# Patient Record
Sex: FEMALE | Race: WHITE | NOT HISPANIC OR LATINO | Employment: FULL TIME | URBAN - METROPOLITAN AREA
[De-identification: names, ages, dates, MRNs, and addresses within clinical notes are randomized per-mention and may not be internally consistent; named-entity substitution may affect disease eponyms.]

---

## 2019-03-03 ENCOUNTER — HOSPITAL ENCOUNTER (EMERGENCY)
Facility: HOSPITAL | Age: 29
Discharge: HOME | End: 2019-03-04
Attending: EMERGENCY MEDICINE | Admitting: EMERGENCY MEDICINE

## 2019-03-03 DIAGNOSIS — F19.10 SUBSTANCE ABUSE (HCC): ICD-10-CM

## 2019-03-03 DIAGNOSIS — F32.A DEPRESSION, UNSPECIFIED DEPRESSION TYPE: Primary | ICD-10-CM

## 2019-03-03 PROCEDURE — 36415 COLL VENOUS BLD VENIPUNCTURE: CPT

## 2019-03-03 PROCEDURE — 99283 EMERGENCY DEPT VISIT LOW MDM: CPT

## 2019-03-03 RX ORDER — HYDROXYZINE 50 MG/1
50 TABLET, FILM COATED ORAL 3 TIMES DAILY PRN
COMMUNITY
End: 2022-10-06

## 2019-03-03 RX ORDER — FLUOXETINE HYDROCHLORIDE 20 MG/1
20 CAPSULE ORAL DAILY
COMMUNITY
End: 2022-10-06

## 2019-03-04 VITALS
TEMPERATURE: 97.8 F | SYSTOLIC BLOOD PRESSURE: 121 MMHG | OXYGEN SATURATION: 99 % | BODY MASS INDEX: 26.08 KG/M2 | WEIGHT: 156.5 LBS | DIASTOLIC BLOOD PRESSURE: 78 MMHG | HEIGHT: 65 IN | HEART RATE: 88 BPM | RESPIRATION RATE: 18 BRPM

## 2019-03-04 LAB
AMPHET+METHAMPHET UR QL: POSITIVE
BARBITURATES UR QL SCN: NEGATIVE
BENZODIAZ UR QL SCN: NEGATIVE
CANNABINOIDS SERPL QL: NEGATIVE
COCAINE UR QL: NEGATIVE
ETHANOL BLD-MCNC: <10 MG/DL (ref 0–10)
ETHANOL UR QL: <0.01 %
HCG SERPL QL: NEGATIVE
METHADONE UR QL SCN: NEGATIVE
OPIATES UR QL: NEGATIVE
OXYCODONE UR QL SCN: NEGATIVE

## 2019-03-04 PROCEDURE — 80307 DRUG TEST PRSMV CHEM ANLYZR: CPT | Performed by: PHYSICIAN ASSISTANT

## 2019-03-04 PROCEDURE — 84703 CHORIONIC GONADOTROPIN ASSAY: CPT | Performed by: PHYSICIAN ASSISTANT

## 2019-03-04 PROCEDURE — 90791 PSYCH DIAGNOSTIC EVALUATION: CPT

## 2019-03-04 NOTE — ED PROVIDER NOTES
"Pt is a 29 y.o. female who presents to the ED complaining of AMS that started today. Pt does not have any thoughts of hurting herself or anyone else. Currently takes 20 mg Prozac and was on 40 mg originally but states that it makes her \"mean\". There are no other symptoms      On exam, calm,cooperative denies SI       Labs  and imaging  reviewed.     Plan: Discharge, if symptoms persist, follow up with outpatient psychiatry.      MD ATTESTATION NOTE    The [facility] and I have discussed this patient's history, physical exam, and treatment plan.  I have reviewed the documentation and personally had a face to face interaction with the patient. I affirm the documentation and agree with the treatment and plan.  The attached note describes my personal findings.      Documentation assistance provided by basilio Calderón for Dr. Rogers. Information recorded by the basilio was done at my direction and has been verified and validated by me.             Miguel Calderón  03/04/19 6227       Ankur Rogers MD  03/04/19 2621    "

## 2019-03-04 NOTE — CONSULTS
29 year old  white female seen in ED # 25 for psychiatric assessment. Patient presented to the ED accompanied by  her fiance' Jr . She c/o feeling overwhelmed , unable to concentrate, difficulty sleeping and increased irritability.  UDS is positive for methamphetamine and Bal < 10 .   Patient reports hx of MURIEL , anxiety and depression.  She has been prescribed Prozac and Atarax by her PCP, Dr. Carl, but admits that she has not been compliant with these  Medications for the past 2 months. . She says that over the past 2 years she has also tried Risperdal, Celexa, Wellbutrin and trazodone.   Patient denies hx of  formal psych or CD tx.  She admits to past abuse of ' xanax, weed, cocaine and meth ' and last used meth 4-5 days ago.   She also reports hx of DUI and is currently on parole for ' possession.'  Patient denies S/I and H/I. She denies A/V hallucinations and denies hx of abuse. Patient also denies family hx of mental illness and MURIEL.   Patient has 3 children , ages 13,7 and 3 y/o , who are in the full custody of their biological father. Patient says she is not employed and lives with her mother and an uncle , who are supportive .  She admits that she also has a .   Medication compliance issues and MURIEL discussed with patient.  She is educated about the short and long term effects of drug abuse and how drugs can relate to her current complaints.  Patient is encouraged to avoid  illegal drugs and to take her medications daily, as prescribed, in order to obtain  the optimal effects from her medications. Patient says she can no longer get a Rx from Dr. Carl, and will need to get a referral for further medication management. She admits however,  that she still has medication at home and agrees to take it as prescribed.   Tx options explored and patient provided with a referral to [facility] for f/u with a Psychiatrist or Nurse Practitioner for medication evaluation and management.   She  is appreciative of referral provided and agrees to contact the office today to make a new patient appointment asap.    Disposition plan discussed with Juan Manuel BUSH, and he agrees. Patient will be discharged back home to f/u as instructed.  Cece' also supportive of recommendations provided.

## 2019-03-04 NOTE — ED PROVIDER NOTES
" EMERGENCY DEPARTMENT ENCOUNTER    CHIEF COMPLAINT  Chief Complaint: psychiatric evaluation  History given by: patient   History limited by: nothing  Room Number: 25/25  PMD: Gus Herrera MD      HPI:  Pt is a 29 y.o. female who presents to the ED for a psychiatric evaluation for a long-standing hx of anxiety and depression. Pt denies fever, abd pain, chills, SOA, and any suicidal thoughts. Pt reports she has been taking Vistaril for anxiety and Prozac for depression and she felt that they were not helping anymore. Pt states she lost her physician and came in today for further care and for a psychiatric evaluation. Pt is a recreational drug user. There are no other complaints at this time.     Duration: months   Onset: gradual  Timing: constant   Quality: \"psychiatric evaluation\"  Intensity/Severity: moderate   Progression: unchanged   Associated Symptoms: anxiety and depression   Aggravating Factors: none mentioned  Alleviating Factors: none mentioned   Previous Episodes: pt reports she has been taking Vistaril for anxiety and Prozac for depression and she felt that they were not helping anymore  Treatment before arrival: none    PAST MEDICAL HISTORY  Active Ambulatory Problems     Diagnosis Date Noted   • No Active Ambulatory Problems     Resolved Ambulatory Problems     Diagnosis Date Noted   • No Resolved Ambulatory Problems     Past Medical History:   Diagnosis Date   • Anxiety    • Depression    • Hepatitis C    • Substance abuse (CMS/HCC)        PAST SURGICAL HISTORY  Past Surgical History:   Procedure Laterality Date   • LAPAROSCOPIC TUBAL LIGATION     • WISDOM TOOTH EXTRACTION         FAMILY HISTORY  Family History   Problem Relation Age of Onset   • No Known Problems Mother    • No Known Problems Father        SOCIAL HISTORY  Social History     Socioeconomic History   • Marital status:      Spouse name: Not on file   • Number of children: Not on file   • Years of education: Not on file   • " Highest education level: Not on file   Social Needs   • Financial resource strain: Not on file   • Food insecurity - worry: Not on file   • Food insecurity - inability: Not on file   • Transportation needs - medical: Not on file   • Transportation needs - non-medical: Not on file   Occupational History   • Not on file   Tobacco Use   • Smoking status: Smoker, Current Status Unknown     Packs/day: 1.00     Types: Cigarettes   • Smokeless tobacco: Never Used   Substance and Sexual Activity   • Alcohol use: No     Frequency: Never   • Drug use: Yes     Types: Methamphetamines     Comment: reports use 4-5 days ago   • Sexual activity: Defer   Other Topics Concern   • Not on file   Social History Narrative   • Not on file       ALLERGIES  Bactrim [sulfamethoxazole-trimethoprim]; Codeine; and Penicillins    REVIEW OF SYSTEMS  Review of Systems   Constitutional: Negative for chills and fever.   HENT: Negative for sore throat.    Eyes: Negative.    Respiratory: Negative for cough and shortness of breath.    Cardiovascular: Negative for chest pain.   Gastrointestinal: Negative for abdominal pain, diarrhea and vomiting.   Genitourinary: Negative for dysuria.   Musculoskeletal: Negative for neck pain.   Skin: Negative for rash.   Neurological: Negative for weakness, numbness and headaches.   Hematological: Negative.    Psychiatric/Behavioral: Negative for self-injury and suicidal ideas. The patient is nervous/anxious.         Depression   All other systems reviewed and are negative.      PHYSICAL EXAM  ED Triage Vitals   Temp Heart Rate Resp BP SpO2   03/03/19 2357 03/03/19 2357 03/03/19 2357 03/04/19 0000 03/03/19 2357   97.2 °F (36.2 °C) 93 18 123/79 100 %      Temp src Heart Rate Source Patient Position BP Location FiO2 (%)   03/03/19 2357 -- -- -- --   Tympanic           Physical Exam   Constitutional: She is oriented to person, place, and time. No distress.   HENT:   Head: Normocephalic and atraumatic.   Eyes: EOM are  normal. Pupils are equal, round, and reactive to light.   Neck: Normal range of motion. Neck supple.   Cardiovascular: Normal rate, regular rhythm and normal heart sounds.   Pulmonary/Chest: Effort normal and breath sounds normal. No respiratory distress.   Abdominal: Soft. There is no tenderness. There is no rebound and no guarding.   Musculoskeletal: Normal range of motion. She exhibits no edema.   Neurological: She is alert and oriented to person, place, and time. She has normal sensation and normal strength.   Skin: Skin is warm and dry. No rash noted.   Psychiatric: Mood and affect normal.   Nursing note and vitals reviewed.      LAB RESULTS  Lab Results (last 24 hours)     Procedure Component Value Units Date/Time    hCG, Serum, Qualitative [[id number]]  (Normal) Collected:  03/04/19 0016    Specimen:  Blood Updated:  03/04/19 0101     HCG Qualitative Negative    Ethanol [[id number]] Collected:  03/04/19 0016    Specimen:  Blood Updated:  03/04/19 0051     Ethanol <10 mg/dL      Ethanol % <0.010 %     Urine Drug Screen - Urine, Clean Catch [[id number]]  (Abnormal) Collected:  03/04/19 0016    Specimen:  Urine, Clean Catch Updated:  03/04/19 0108     Amphet/Methamphet, Screen Positive     Barbiturates Screen, Urine Negative     Benzodiazepine Screen, Urine Negative     Cocaine Screen, Urine Negative     Opiate Screen Negative     THC, Screen, Urine Negative     Methadone Screen, Urine Negative     Oxycodone Screen, Urine Negative    Narrative:       Negative Thresholds For Drugs Screened:     Amphetamines               500 ng/ml   Barbiturates               200 ng/ml   Benzodiazepines            100 ng/ml   Cocaine                    300 ng/ml   Methadone                  300 ng/ml   Opiates                    300 ng/ml   Oxycodone                  100 ng/ml   THC                        50 ng/ml    The Normal Value for all drugs tested is negative. This report includes final unconfirmed screening results to be used  for medical treatment purposes only. Unconfirmed results must not be used for non-medical purposes such as employment or legal testing. Clinical consideration should be applied to any drug of abuse test, particulary when unconfirmed results are used.          I ordered the above labs and reviewed the results    RADIOLOGY  No orders to display        I ordered the above noted radiological studies. Interpreted by radiologist. Reviewed by me in PACS.       PROCEDURES  Procedures      PROGRESS AND CONSULTS       0006  Ordered urine drug screen and ethanol for further evaluation.    0117  Placed a [facility] consult.     0200  Discussed pt case w Dr. Rogers who agrees w the plan of care.    0210  Rechecked pt. Pt is resting comfortably. Notified pt of lab results. Instructed the pt to RTER if any new/worsening symptoms develop. Advised pt to follow up w recommendations as given by [facility]. Discussed the plan to discharge. Pt understands and agrees with the plan, all questions answered.      MEDICAL DECISION MAKING  Results were reviewed/discussed with the patient and they were also made aware of online access. Pt also made aware that some labs, such as cultures, will not be resulted during ER visit and follow up with PMD is necessary.     MDM  Number of Diagnoses or Management Options     Amount and/or Complexity of Data Reviewed  Clinical lab tests: ordered and reviewed (Ethanol= <0.010  Amphet/Methamphet= positive)  Decide to obtain previous medical records or to obtain history from someone other than the patient: yes  Review and summarize past medical records: yes (Pt's previous medical records show pt was seen at [facility] on 11/21/2018 and was dx w hives.)           DIAGNOSIS  Final diagnoses:   Depression, unspecified depression type   Substance abuse (CMS/[facility])       DISPOSITION  DISCHARGE    Patient discharged in stable condition.    Reviewed implications of results, diagnosis, meds,  responsibility to follow up, warning signs and symptoms of possible worsening, potential complications and reasons to return to ER.    Patient/Family voiced understanding of above instructions.    Discussed plan for discharge, as there is no emergent indication for admission. Patient referred to primary care provider for BP management due to today's BP. Pt/family is agreeable and understands need for follow up and repeat testing.  Pt is aware that discharge does not mean that nothing is wrong but it indicates no emergency is present that requires admission and they must continue care with follow-up as given below or physician of their choice.     FOLLOW-UP  Contacts provided by [facility]    Schedule an appointment as soon as possible for a visit   For further evaluation and treatment         Medication List      No changes were made to your prescriptions during this visit.           Latest Documented Vital Signs:  As of 5:50 AM  BP- 121/78 HR- 88 Temp- 97.8 °F (36.6 °C) (Oral) O2 sat- 99%    --  Documentation assistance provided by basilio Singh for Juan Manuel Rose PA-C. Information recorded by the scribe was done at my direction and has been verified and validated by me.        Lainey Singh  03/04/19 8933       Lito Rose III, PA  03/04/19 5898

## 2019-03-04 NOTE — ED NOTES
Patient states she is feeling overwhelmed. Unable to concentrate. Difficulty sleeping, increased irritability at home. States that her medications for depression are prescribed by her General Practitioner Doctor.      Patsy Mock, RN  03/04/19 0027       Patsy Mock, RN  03/04/19 0029